# Patient Record
Sex: FEMALE | Race: BLACK OR AFRICAN AMERICAN | URBAN - METROPOLITAN AREA
[De-identification: names, ages, dates, MRNs, and addresses within clinical notes are randomized per-mention and may not be internally consistent; named-entity substitution may affect disease eponyms.]

---

## 2018-01-11 ENCOUNTER — OFFICE VISIT (OUTPATIENT)
Dept: INTERNAL MEDICINE CLINIC | Age: 83
End: 2018-01-11

## 2018-01-11 VITALS
TEMPERATURE: 98.2 F | BODY MASS INDEX: 23.95 KG/M2 | RESPIRATION RATE: 16 BRPM | DIASTOLIC BLOOD PRESSURE: 64 MMHG | WEIGHT: 122 LBS | HEIGHT: 60 IN | HEART RATE: 64 BPM | SYSTOLIC BLOOD PRESSURE: 120 MMHG

## 2018-01-11 DIAGNOSIS — K21.9 GASTROESOPHAGEAL REFLUX DISEASE, ESOPHAGITIS PRESENCE NOT SPECIFIED: ICD-10-CM

## 2018-01-11 DIAGNOSIS — G30.1 LATE ONSET ALZHEIMER'S DISEASE WITH BEHAVIORAL DISTURBANCE (HCC): ICD-10-CM

## 2018-01-11 DIAGNOSIS — Z86.73 H/O: CVA (CEREBROVASCULAR ACCIDENT): ICD-10-CM

## 2018-01-11 DIAGNOSIS — R73.03 PRE-DIABETES: ICD-10-CM

## 2018-01-11 DIAGNOSIS — F02.818 LATE ONSET ALZHEIMER'S DISEASE WITH BEHAVIORAL DISTURBANCE (HCC): ICD-10-CM

## 2018-01-11 DIAGNOSIS — I10 ESSENTIAL HYPERTENSION: Primary | ICD-10-CM

## 2018-01-11 PROCEDURE — 99204 OFFICE O/P NEW MOD 45 MIN: CPT | Performed by: INTERNAL MEDICINE

## 2018-01-11 RX ORDER — ACETAMINOPHEN 160 MG
1 TABLET,DISINTEGRATING ORAL DAILY
Qty: 180 CAPSULE | Refills: 5 | Status: SHIPPED | OUTPATIENT
Start: 2018-01-11 | End: 2019-01-24 | Stop reason: SDUPTHER

## 2018-01-11 RX ORDER — BRIMONIDINE TARTRATE 2 MG/ML
1 SOLUTION/ DROPS OPHTHALMIC 2 TIMES DAILY
COMMUNITY

## 2018-01-11 RX ORDER — ATORVASTATIN CALCIUM 20 MG/1
20 TABLET, FILM COATED ORAL DAILY
COMMUNITY
End: 2018-01-11 | Stop reason: SDUPTHER

## 2018-01-11 RX ORDER — ACETAMINOPHEN 160 MG
1 TABLET,DISINTEGRATING ORAL DAILY
COMMUNITY
End: 2018-01-11 | Stop reason: SDUPTHER

## 2018-01-11 RX ORDER — LOSARTAN POTASSIUM 50 MG/1
50 TABLET ORAL DAILY
Qty: 90 TABLET | Refills: 2 | Status: SHIPPED | OUTPATIENT
Start: 2018-01-11 | End: 2018-10-16 | Stop reason: SDUPTHER

## 2018-01-11 RX ORDER — CALCIUM CARBONATE 500(1250)
500 TABLET ORAL DAILY
Qty: 180 TABLET | Refills: 5 | Status: SHIPPED | OUTPATIENT
Start: 2018-01-11 | End: 2020-03-05 | Stop reason: ALTCHOICE

## 2018-01-11 RX ORDER — CALCIUM CARBONATE 500(1250)
500 TABLET ORAL DAILY
COMMUNITY
End: 2018-01-11 | Stop reason: SDUPTHER

## 2018-01-11 RX ORDER — AMLODIPINE BESYLATE 10 MG/1
10 TABLET ORAL DAILY
COMMUNITY
End: 2018-01-11 | Stop reason: SDUPTHER

## 2018-01-11 RX ORDER — DORZOLAMIDE HCL 20 MG/ML
1 SOLUTION/ DROPS OPHTHALMIC 2 TIMES DAILY
COMMUNITY

## 2018-01-11 RX ORDER — LOSARTAN POTASSIUM 50 MG/1
50 TABLET ORAL DAILY
COMMUNITY
End: 2018-01-11 | Stop reason: SDUPTHER

## 2018-01-11 RX ORDER — POTASSIUM CHLORIDE 750 MG/1
10 TABLET, EXTENDED RELEASE ORAL DAILY
COMMUNITY
End: 2018-01-11 | Stop reason: SDUPTHER

## 2018-01-11 RX ORDER — FLUOXETINE HYDROCHLORIDE 20 MG/1
20 CAPSULE ORAL EVERY MORNING
Qty: 30 CAPSULE | Refills: 3 | Status: SHIPPED | OUTPATIENT
Start: 2018-01-11 | End: 2018-05-07 | Stop reason: SDUPTHER

## 2018-01-11 RX ORDER — POTASSIUM CHLORIDE 20 MEQ/1
20 TABLET, EXTENDED RELEASE ORAL DAILY
Qty: 90 TABLET | Refills: 1 | Status: SHIPPED | OUTPATIENT
Start: 2018-01-11 | End: 2018-07-05 | Stop reason: SDUPTHER

## 2018-01-11 RX ORDER — OMEPRAZOLE 40 MG/1
40 CAPSULE, DELAYED RELEASE ORAL
Qty: 30 CAPSULE | Refills: 3 | Status: SHIPPED | OUTPATIENT
Start: 2018-01-11 | End: 2018-05-07 | Stop reason: SDUPTHER

## 2018-01-11 RX ORDER — AMLODIPINE BESYLATE 10 MG/1
10 TABLET ORAL DAILY
Qty: 90 TABLET | Refills: 2 | Status: SHIPPED | OUTPATIENT
Start: 2018-01-11 | End: 2018-09-06 | Stop reason: ALTCHOICE

## 2018-01-11 RX ORDER — ATORVASTATIN CALCIUM 20 MG/1
20 TABLET, FILM COATED ORAL DAILY
Qty: 90 TABLET | Refills: 2 | Status: SHIPPED | OUTPATIENT
Start: 2018-01-11 | End: 2018-09-06 | Stop reason: SDUPTHER

## 2018-01-11 RX ORDER — QUETIAPINE FUMARATE 25 MG/1
25 TABLET, FILM COATED ORAL DAILY
Qty: 90 TABLET | Refills: 2 | Status: SHIPPED | OUTPATIENT
Start: 2018-01-11 | End: 2018-09-06 | Stop reason: SDUPTHER

## 2018-01-11 RX ORDER — QUETIAPINE FUMARATE 25 MG/1
25 TABLET, FILM COATED ORAL DAILY
COMMUNITY
End: 2018-01-11 | Stop reason: SDUPTHER

## 2018-01-11 RX ORDER — DONEPEZIL HYDROCHLORIDE 5 MG/1
5 TABLET, FILM COATED ORAL NIGHTLY
Qty: 30 TABLET | Refills: 1 | Status: SHIPPED | OUTPATIENT
Start: 2018-01-11 | End: 2018-03-07 | Stop reason: SDUPTHER

## 2018-01-14 PROBLEM — G30.1 LATE ONSET ALZHEIMER'S DISEASE WITH BEHAVIORAL DISTURBANCE (HCC): Status: ACTIVE | Noted: 2018-01-14

## 2018-01-14 PROBLEM — F02.818 LATE ONSET ALZHEIMER'S DISEASE WITH BEHAVIORAL DISTURBANCE (HCC): Status: ACTIVE | Noted: 2018-01-14

## 2018-01-14 ASSESSMENT — ENCOUNTER SYMPTOMS
VOMITING: 0
COUGH: 0
CONSTIPATION: 0
SHORTNESS OF BREATH: 0
DIARRHEA: 0

## 2018-01-15 NOTE — PROGRESS NOTES
results found for any previous visit. No results found. I have reviewed all the available results (including but not limited to labs, Xrays, CT scans, ultrasound, Echo, and angiogram report) and / or personally reviewed the images where available, including results available from outside the [facility]    I have ordered outside records where not available through EMR    Assessment:      ICD-10-CM ICD-9-CM    1. Essential hypertension I10 401.9 RENAL FUNCTION PANEL      CBC WITH AUTO DIFFERENTIAL      TSH without Reflex      T4, Free      LIPID PANEL   2. Late onset Alzheimer's disease with behavioral disturbance G30.1 331.0 VITAMIN B12    F02.81 294.11 MRI Brain W WO Contrast   3. Pre-diabetes R73.03 790.29 LIPID PANEL      HEMOGLOBIN A1C   4. H/O: CVA (cerebrovascular accident) Z86.73 V12.54 LIPID PANEL   5. Gastroesophageal reflux disease, esophagitis presence not specified K21.9 530.81        HTN well controlled    Plan:    Start Aricept 5 mg and reduce the Seroquel to prn  Check for organic causes of memory loss  Start Prozac 20 mg for anxiety and panic   Continue BP meds, statin, and ASA      Orders Placed This Encounter   Procedures    MRI Brain W WO Contrast    VITAMIN B12    RENAL FUNCTION PANEL    CBC WITH AUTO DIFFERENTIAL    TSH without Reflex    T4, Free    LIPID PANEL    HEMOGLOBIN A1C          Medication List          Accurate as of 1/11/18 11:59 PM. If you have any questions, ask your nurse or doctor.                START taking these medications    donepezil 5 MG tablet  Commonly known as:  ARICEPT  Take 1 tablet by mouth nightly  Started by:  Maria Luisa Schaffer MD     FLUoxetine 20 MG capsule  Commonly known as:  PROZAC  Take 1 capsule by mouth every morning  Started by:  Maria Luisa Schaffer MD     omeprazole 40 MG delayed release capsule  Commonly known as:  PRILOSEC  Take 1 capsule by mouth every morning (before breakfast)  Started by:  Maria Luisa Schaffer MD        CHANGE

## 2018-02-15 ENCOUNTER — HOSPITAL ENCOUNTER (OUTPATIENT)
Dept: MRI IMAGING | Age: 83
Discharge: HOME | End: 2018-02-15
Attending: INTERNAL MEDICINE | Admitting: INTERNAL MEDICINE

## 2018-02-15 DIAGNOSIS — G30.1 LATE ONSET ALZHEIMER'S DISEASE WITH BEHAVIORAL DISTURBANCE (HCC): ICD-10-CM

## 2018-02-15 DIAGNOSIS — G30.1 ALZHEIMER'S DISEASE WITH LATE ONSET (CODE) (HCC): ICD-10-CM

## 2018-02-15 DIAGNOSIS — F02.818 LATE ONSET ALZHEIMER'S DISEASE WITH BEHAVIORAL DISTURBANCE (HCC): ICD-10-CM

## 2018-03-07 RX ORDER — DONEPEZIL HYDROCHLORIDE 5 MG/1
TABLET, FILM COATED ORAL
Qty: 30 TABLET | Refills: 1 | Status: SHIPPED | OUTPATIENT
Start: 2018-03-07 | End: 2018-09-04 | Stop reason: SDUPTHER

## 2018-04-20 ENCOUNTER — TELEPHONE (OUTPATIENT)
Dept: INTERNAL MEDICINE CLINIC | Age: 83
End: 2018-04-20

## 2018-05-03 ENCOUNTER — OFFICE VISIT (OUTPATIENT)
Dept: INTERNAL MEDICINE CLINIC | Age: 83
End: 2018-05-03

## 2018-05-03 VITALS
HEART RATE: 64 BPM | SYSTOLIC BLOOD PRESSURE: 136 MMHG | BODY MASS INDEX: 21.19 KG/M2 | WEIGHT: 114 LBS | DIASTOLIC BLOOD PRESSURE: 68 MMHG | RESPIRATION RATE: 16 BRPM

## 2018-05-03 DIAGNOSIS — F02.818 LATE ONSET ALZHEIMER'S DISEASE WITH BEHAVIORAL DISTURBANCE (HCC): ICD-10-CM

## 2018-05-03 DIAGNOSIS — I10 ESSENTIAL HYPERTENSION: Primary | ICD-10-CM

## 2018-05-03 DIAGNOSIS — G30.1 LATE ONSET ALZHEIMER'S DISEASE WITH BEHAVIORAL DISTURBANCE (HCC): ICD-10-CM

## 2018-05-03 DIAGNOSIS — Z78.0 ASYMPTOMATIC MENOPAUSAL STATE: ICD-10-CM

## 2018-05-03 PROCEDURE — 99213 OFFICE O/P EST LOW 20 MIN: CPT | Performed by: INTERNAL MEDICINE

## 2018-05-06 ASSESSMENT — ENCOUNTER SYMPTOMS
SHORTNESS OF BREATH: 0
RHINORRHEA: 0
BACK PAIN: 0
COUGH: 0
WHEEZING: 0
APNEA: 0
CONSTIPATION: 0
TROUBLE SWALLOWING: 0
EYE PAIN: 0
SORE THROAT: 0
NAUSEA: 0
EYE DISCHARGE: 0
ABDOMINAL DISTENTION: 0
DIARRHEA: 0
BLOOD IN STOOL: 0
VOMITING: 0
ABDOMINAL PAIN: 0

## 2018-05-07 RX ORDER — OMEPRAZOLE 40 MG/1
CAPSULE, DELAYED RELEASE ORAL
Qty: 30 CAPSULE | Refills: 3 | Status: SHIPPED | OUTPATIENT
Start: 2018-05-07 | End: 2018-09-04 | Stop reason: SDUPTHER

## 2018-05-07 RX ORDER — FLUOXETINE HYDROCHLORIDE 20 MG/1
CAPSULE ORAL
Qty: 30 CAPSULE | Refills: 3 | Status: SHIPPED | OUTPATIENT
Start: 2018-05-07 | End: 2018-09-04 | Stop reason: SDUPTHER

## 2018-05-07 RX ORDER — DONEPEZIL HYDROCHLORIDE 5 MG/1
TABLET, FILM COATED ORAL
Qty: 30 TABLET | Refills: 1 | Status: SHIPPED | OUTPATIENT
Start: 2018-05-07 | End: 2018-07-05 | Stop reason: SDUPTHER

## 2018-07-05 RX ORDER — DONEPEZIL HYDROCHLORIDE 5 MG/1
TABLET, FILM COATED ORAL
Qty: 30 TABLET | Refills: 1 | Status: SHIPPED | OUTPATIENT
Start: 2018-07-05 | End: 2018-09-04 | Stop reason: ALTCHOICE

## 2018-07-05 RX ORDER — POTASSIUM CHLORIDE 1500 MG/1
TABLET, EXTENDED RELEASE ORAL
Qty: 90 TABLET | Refills: 1 | Status: SHIPPED | OUTPATIENT
Start: 2018-07-05 | End: 2019-03-07

## 2018-07-18 ENCOUNTER — TELEPHONE (OUTPATIENT)
Dept: INTERNAL MEDICINE CLINIC | Age: 83
End: 2018-07-18

## 2018-09-04 RX ORDER — DONEPEZIL HYDROCHLORIDE 5 MG/1
TABLET, FILM COATED ORAL
Qty: 30 TABLET | Refills: 0 | Status: SHIPPED | OUTPATIENT
Start: 2018-09-04 | End: 2018-09-06 | Stop reason: SDUPTHER

## 2018-09-04 RX ORDER — FLUOXETINE HYDROCHLORIDE 20 MG/1
CAPSULE ORAL
Qty: 30 CAPSULE | Refills: 0 | Status: SHIPPED | OUTPATIENT
Start: 2018-09-04 | End: 2018-09-06 | Stop reason: SDUPTHER

## 2018-09-04 RX ORDER — OMEPRAZOLE 40 MG/1
CAPSULE, DELAYED RELEASE ORAL
Qty: 30 CAPSULE | Refills: 0 | Status: SHIPPED | OUTPATIENT
Start: 2018-09-04 | End: 2019-03-07

## 2018-09-06 ENCOUNTER — OFFICE VISIT (OUTPATIENT)
Dept: INTERNAL MEDICINE CLINIC | Age: 83
End: 2018-09-06

## 2018-09-06 VITALS
RESPIRATION RATE: 16 BRPM | DIASTOLIC BLOOD PRESSURE: 66 MMHG | TEMPERATURE: 98.3 F | HEART RATE: 96 BPM | BODY MASS INDEX: 21.99 KG/M2 | WEIGHT: 112 LBS | SYSTOLIC BLOOD PRESSURE: 96 MMHG | HEIGHT: 60 IN

## 2018-09-06 DIAGNOSIS — I10 ESSENTIAL HYPERTENSION: ICD-10-CM

## 2018-09-06 DIAGNOSIS — E78.00 HYPERCHOLESTEROLEMIA: ICD-10-CM

## 2018-09-06 DIAGNOSIS — F41.8 DEPRESSION WITH ANXIETY: Primary | ICD-10-CM

## 2018-09-06 PROCEDURE — 90662 IIV NO PRSV INCREASED AG IM: CPT | Performed by: INTERNAL MEDICINE

## 2018-09-06 PROCEDURE — 99214 OFFICE O/P EST MOD 30 MIN: CPT | Performed by: INTERNAL MEDICINE

## 2018-09-06 PROCEDURE — 90471 IMMUNIZATION ADMIN: CPT | Performed by: INTERNAL MEDICINE

## 2018-09-06 RX ORDER — DONEPEZIL HYDROCHLORIDE 5 MG/1
TABLET, FILM COATED ORAL
Qty: 90 TABLET | Refills: 1 | Status: SHIPPED | OUTPATIENT
Start: 2018-09-06 | End: 2019-03-07 | Stop reason: DRUGHIGH

## 2018-09-06 RX ORDER — ATORVASTATIN CALCIUM 20 MG/1
20 TABLET, FILM COATED ORAL DAILY
Qty: 90 TABLET | Refills: 1 | Status: SHIPPED | OUTPATIENT
Start: 2018-09-06 | End: 2019-03-07 | Stop reason: DRUGHIGH

## 2018-09-06 RX ORDER — FLUOXETINE HYDROCHLORIDE 20 MG/1
CAPSULE ORAL
Qty: 90 CAPSULE | Refills: 1 | Status: SHIPPED | OUTPATIENT
Start: 2018-09-06 | End: 2019-03-07

## 2018-09-06 RX ORDER — QUETIAPINE FUMARATE 25 MG/1
25 TABLET, FILM COATED ORAL DAILY
Qty: 90 TABLET | Refills: 1 | Status: SHIPPED | OUTPATIENT
Start: 2018-09-06 | End: 2019-03-07 | Stop reason: ALTCHOICE

## 2018-09-06 NOTE — PROGRESS NOTES
PROGRESS NOTE:    Demian Ibrahim    9/6/2018    Chief Complaint   Patient presents with    New Patient     Former patient Dr. Singh Dove     HPI:    Mr(s)Melva Ibrahim presents to clinic today with issues noted above. Patient is taking BP medications at home without side effects, BP is not being checked at home. Patient is tolerating anti-lipid meds such as statin without complications, no myalgia. Patient is taking antidepressant/anxiolytics as prescribed, denies suicidal idealolgy/plan, crying out episodes, feels anxiety and depression is overall controlled. Patient denies chest pain, SOB, NVD, FC, rash, malaise, rigor, dizziness/lightheadness, other pertinent ROS was also reviewed. BP 96/66 (Site: Right Arm, Position: Sitting, Cuff Size: Medium Adult)   Pulse 96 Comment: Regular  Temp 98.3 °F (36.8 °C) (Oral)   Resp 16   Ht 5' (1.524 m)   Wt 112 lb (50.8 kg)   BMI 21.87 kg/m²   Body mass index is 21.87 kg/m². No Known Allergies    Physical Exam:    Gen: Patient appears well groomed, well appearing  HEAD: Atraumatic, normocephalic,   Eyes: PERRLA, EOMI   Neck: supple, no thyroid nodule appreciated, no JVD  Chest: Clear to auscultation REBECCA, unlabored breathing, normal expansion  Heart: Regular rate, regular rhythm, no murmur, no rub  Abdomen: Non-tender, non-distended, bowel sounds present x3  Extremities: no edema, distal pulses intact  Patient was alert and oriented to person, place and time    Ada was seen today for new patient. Diagnoses and all orders for this visit:    Depression with anxiety  Patient is taking antidepressant/anxiolytics as prescribed, denies suicidal idealolgy/plan, crying out episodes, feels anxiety and depression is overall controlled. Essential hypertension  Controlled overall, continue meds, follow renal function     Hypercholesterolemia  Patient is tolerating anti-lipid meds such as statin without complications, no myalgia.        Preventive medicine: patient has agreed to indicated immunizations. Needs fluzone today. No orders of the defined types were placed in this encounter. Prior to Admission medications    Medication Sig Start Date End Date Taking?  Authorizing Provider   donepezil (ARICEPT) 5 MG tablet TAKE ONE TABLET BY MOUTH ONCE DAILY AT  NIGHT 9/4/18  Yes Emir Chua, DO   FLUoxetine (PROZAC) 20 MG capsule TAKE ONE CAPSULE BY MOUTH ONCE DAILY IN THE MORNING 9/4/18  Yes Ej Macdonald, DO   KLOR-CON M20 20 MEQ extended release tablet TAKE ONE TABLET BY MOUTH ONCE DAILY 7/5/18  Yes Abrahan Tomas MD   bimatoprost (LUMIGAN) 0.01 % SOLN ophthalmic drops Place 1 drop into both eyes nightly   Yes Historical Provider, MD   brimonidine (ALPHAGAN) 0.2 % ophthalmic solution Place 1 drop into both eyes 2 times daily   Yes Historical Provider, MD   dorzolamide (TRUSOPT) 2 % ophthalmic solution Place 1 drop into both eyes 2 times daily   Yes Historical Provider, MD   atorvastatin (LIPITOR) 20 MG tablet Take 1 tablet by mouth daily 1/11/18  Yes Abrahan Tomas MD   losartan (COZAAR) 50 MG tablet Take 1 tablet by mouth daily 1/11/18  Yes Abrahan Tomas MD   Cholecalciferol (VITAMIN D3) 2000 units CAPS Take 1 capsule by mouth daily 1/11/18  Yes Abrahan Tomas MD   calcium carbonate (OSCAL) 500 MG TABS tablet Take 1 tablet by mouth daily 1/11/18  Yes Abrahan Tomas MD   omeprazole (PRILOSEC) 40 MG delayed release capsule TAKE ONE CAPSULE BY MOUTH ONCE DAILY AS NEEDED ONLY 9/4/18   Emir Chua, DO   zoster recombinant adjuvanted vaccine [facility]) 50 MCG SUSR injection Inject 0.5 ml intramuscularly now and repeat dose in 2 months 5/3/18   Abrahan Tomas MD   QUEtiapine (SEROQUEL) 25 MG tablet Take 1 tablet by mouth daily 1/11/18   Abrahan Tomas MD     Past Medical History:   Diagnosis Date    Glaucoma     HTN (hypertension)     Hyperlipidemia      Past Surgical History:   Procedure Laterality Date    CATARACT REMOVAL      COLONOSCOPY  2001

## 2018-09-06 NOTE — PATIENT INSTRUCTIONS
Patient Education        DASH Diet: Care Instructions  Your Care Instructions    The DASH diet is an eating plan that can help lower your blood pressure. DASH stands for Dietary Approaches to Stop Hypertension. Hypertension is high blood pressure. The DASH diet focuses on eating foods that are high in calcium, potassium, and magnesium. These nutrients can lower blood pressure. The foods that are highest in these nutrients are fruits, vegetables, low-fat dairy products, nuts, seeds, and legumes. But taking calcium, potassium, and magnesium supplements instead of eating foods that are high in those nutrients does not have the same effect. The DASH diet also includes whole grains, fish, and poultry. The DASH diet is one of several lifestyle changes your doctor may recommend to lower your high blood pressure. Your doctor may also want you to decrease the amount of sodium in your diet. Lowering sodium while following the DASH diet can lower blood pressure even further than just the DASH diet alone. Follow-up care is a key part of your treatment and safety. Be sure to make and go to all appointments, and call your doctor if you are having problems. It's also a good idea to know your test results and keep a list of the medicines you take. How can you care for yourself at home? Following the DASH diet  · Eat 4 to 5 servings of fruit each day. A serving is 1 medium-sized piece of fruit, ½ cup chopped or canned fruit, 1/4 cup dried fruit, or 4 ounces (½ cup) of fruit juice. Choose fruit more often than fruit juice. · Eat 4 to 5 servings of vegetables each day. A serving is 1 cup of lettuce or raw leafy vegetables, ½ cup of chopped or cooked vegetables, or 4 ounces (½ cup) of vegetable juice. Choose vegetables more often than vegetable juice. · Get 2 to 3 servings of low-fat and fat-free dairy each day. A serving is 8 ounces of milk, 1 cup of yogurt, or 1 ½ ounces of cheese. · Eat 6 to 8 servings of grains each day. meals using beans and peas. Add garbanzo or kidney beans to salads. Make burritos and tacos with mashed malcolm beans or black beans. Where can you learn more? Go to https://[email or web address].Movli. org and sign in to your [employer] account. Enter W189 in the [employer] box to learn more about \"DASH Diet: Care Instructions. \"     If you do not have an account, please click on the \"Sign Up Now\" link. Current as of: December 6, 2017  Content Version: 11.7  © 6760-0050 [employer]. Care instructions adapted under license by [facility] ([facility]). If you have questions about a medical condition or this instruction, always ask your healthcare professional. [address] any warranty or liability for your use of this information.

## 2018-09-06 NOTE — PROGRESS NOTES
Vaccine Information Sheet, \"Influenza - Inactivated\"  given to Pia Cunningham, or parent/legal guardian of  Pia Cunningham and verbalized understanding. Patient responses:    Have you ever had a reaction to a flu vaccine? No  Are you able to eat eggs without adverse effects? Yes  Do you have any current illness? No  Have you ever had Guillian [place] Syndrome? No    Flu vaccine given per order. Please see immunization tab.

## 2018-09-20 ENCOUNTER — TELEPHONE (OUTPATIENT)
Dept: INTERNAL MEDICINE CLINIC | Age: 83
End: 2018-09-20

## 2018-09-21 NOTE — TELEPHONE ENCOUNTER
Average ~081-559 systolic, also diastolic is fluctuating quite a bit, ensure BP is checked same time daily, such as getting up in the morning and check BP first, back is supported, arm @ heart level, same arm (right preferred), keep log and also keep HR values also.

## 2018-10-16 DIAGNOSIS — I10 ESSENTIAL HYPERTENSION: Primary | ICD-10-CM

## 2018-10-16 DIAGNOSIS — E78.00 HYPERCHOLESTEROLEMIA: ICD-10-CM

## 2018-10-16 RX ORDER — LOSARTAN POTASSIUM 50 MG/1
50 TABLET ORAL DAILY
Qty: 90 TABLET | Refills: 0 | Status: SHIPPED | OUTPATIENT
Start: 2018-10-16 | End: 2019-03-07

## 2018-11-02 ENCOUNTER — TELEPHONE (OUTPATIENT)
Dept: INTERNAL MEDICINE CLINIC | Age: 83
End: 2018-11-02

## 2019-01-29 RX ORDER — ACETAMINOPHEN 160 MG
1 TABLET,DISINTEGRATING ORAL DAILY
Qty: 90 CAPSULE | Refills: 1 | Status: SHIPPED | OUTPATIENT
Start: 2019-01-29 | End: 2020-03-05 | Stop reason: ALTCHOICE

## 2019-03-07 ENCOUNTER — OFFICE VISIT (OUTPATIENT)
Dept: INTERNAL MEDICINE CLINIC | Age: 84
End: 2019-03-07
Payer: COMMERCIAL

## 2019-03-07 ENCOUNTER — TELEPHONE (OUTPATIENT)
Dept: INTERNAL MEDICINE CLINIC | Age: 84
End: 2019-03-07

## 2019-03-07 VITALS
TEMPERATURE: 97.5 F | SYSTOLIC BLOOD PRESSURE: 110 MMHG | RESPIRATION RATE: 16 BRPM | WEIGHT: 109 LBS | BODY MASS INDEX: 21.29 KG/M2 | HEART RATE: 60 BPM | DIASTOLIC BLOOD PRESSURE: 60 MMHG | OXYGEN SATURATION: 98 %

## 2019-03-07 DIAGNOSIS — F02.818 LATE ONSET ALZHEIMER'S DISEASE WITH BEHAVIORAL DISTURBANCE (HCC): ICD-10-CM

## 2019-03-07 DIAGNOSIS — I10 ESSENTIAL HYPERTENSION: Primary | ICD-10-CM

## 2019-03-07 DIAGNOSIS — G30.1 LATE ONSET ALZHEIMER'S DISEASE WITH BEHAVIORAL DISTURBANCE (HCC): ICD-10-CM

## 2019-03-07 DIAGNOSIS — E78.00 HYPERCHOLESTEROLEMIA: ICD-10-CM

## 2019-03-07 DIAGNOSIS — F41.8 DEPRESSION WITH ANXIETY: ICD-10-CM

## 2019-03-07 DIAGNOSIS — K59.09 OTHER CONSTIPATION: ICD-10-CM

## 2019-03-07 DIAGNOSIS — F51.02 ADJUSTMENT INSOMNIA: ICD-10-CM

## 2019-03-07 PROCEDURE — 99214 OFFICE O/P EST MOD 30 MIN: CPT | Performed by: INTERNAL MEDICINE

## 2019-03-07 RX ORDER — AMITRIPTYLINE HYDROCHLORIDE 25 MG/1
25 TABLET, FILM COATED ORAL NIGHTLY
Qty: 90 TABLET | Refills: 0 | Status: SHIPPED | OUTPATIENT
Start: 2019-03-07 | End: 2019-06-10 | Stop reason: SDUPTHER

## 2019-03-07 RX ORDER — DONEPEZIL HYDROCHLORIDE 10 MG/1
TABLET, FILM COATED ORAL
Qty: 90 TABLET | Refills: 1 | Status: SHIPPED | OUTPATIENT
Start: 2019-03-07 | End: 2019-04-05 | Stop reason: SDUPTHER

## 2019-03-07 RX ORDER — ATORVASTATIN CALCIUM 20 MG/1
10 TABLET, FILM COATED ORAL DAILY
Qty: 90 TABLET | Refills: 1 | Status: SHIPPED
Start: 2019-03-07 | End: 2019-09-05 | Stop reason: ALTCHOICE

## 2019-03-07 RX ORDER — POLYETHYLENE GLYCOL 3350 17 G/17G
17 POWDER, FOR SOLUTION ORAL DAILY PRN
Qty: 510 G | Refills: 0 | Status: SHIPPED | OUTPATIENT
Start: 2019-03-07 | End: 2020-03-05

## 2019-03-07 ASSESSMENT — PATIENT HEALTH QUESTIONNAIRE - PHQ9
SUM OF ALL RESPONSES TO PHQ QUESTIONS 1-9: 1
SUM OF ALL RESPONSES TO PHQ9 QUESTIONS 1 & 2: 1
1. LITTLE INTEREST OR PLEASURE IN DOING THINGS: 1
SUM OF ALL RESPONSES TO PHQ QUESTIONS 1-9: 1
2. FEELING DOWN, DEPRESSED OR HOPELESS: 0

## 2019-03-26 ENCOUNTER — TELEPHONE (OUTPATIENT)
Dept: INTERNAL MEDICINE CLINIC | Age: 84
End: 2019-03-26

## 2019-03-26 NOTE — TELEPHONE ENCOUNTER
Sylwia Dietrich states patient's amitriptyline (ELAVIL) 25 MG tablet and previous medication QUEtiapine (SEROQUEL) 25 MG tablet tablet were interchanged. Sylwia Dietrich also states on the nights of 3/14, 3/15 and 3/16, patient was given Seroquel because patient was out of amitriptyline. Sylwia Dietrich states patient has hallucinations and they have increased since the medication was interchanged. Please advise. Sylwia Dietrich can be reached at [phone].

## 2019-04-02 RX ORDER — DONEPEZIL HYDROCHLORIDE 5 MG/1
TABLET, FILM COATED ORAL
Qty: 90 TABLET | Refills: 1 | OUTPATIENT
Start: 2019-04-02

## 2019-04-02 NOTE — TELEPHONE ENCOUNTER
Patient's daughter requesting the status of refill. Bernardino Maddox never received the refill on 3/7/2019. Please send refill for  donepezil (ARICEPT) 10 MG tablet #90 to pharmacy.

## 2019-04-02 NOTE — TELEPHONE ENCOUNTER
[address] requesting a refill for donepezil (ARICEPT) 10 MG tablet. On 3/7/19, there was no confirmation that the refill was received. Please send refill to pharmacy.

## 2019-04-03 ENCOUNTER — TELEPHONE (OUTPATIENT)
Dept: INTERNAL MEDICINE CLINIC | Age: 84
End: 2019-04-03

## 2019-04-03 NOTE — TELEPHONE ENCOUNTER
Patient's daughter is requesting a new prescription for donepezil (ARICEPT) 5 MG tablet #90. The pharmacy did not confirm receipt on 3/7 and states the prescription was never received. Patient is out of the medication. Please advise or send new prescription to pharmacy.       [address] [place] [address]

## 2019-04-03 NOTE — TELEPHONE ENCOUNTER
Ibis Kamara is pt's daughter and she is calling back to see if message has been addressed. She stated that pt has been out of meds for a week. She was informed that Dr Wendi Cortes and Julio Shore are out of the office. Please call her when meds have been called in.

## 2019-04-05 ENCOUNTER — TELEPHONE (OUTPATIENT)
Dept: INTERNAL MEDICINE CLINIC | Age: 84
End: 2019-04-05

## 2019-04-05 RX ORDER — DONEPEZIL HYDROCHLORIDE 5 MG/1
TABLET, FILM COATED ORAL
Qty: 90 TABLET | Refills: 1 | Status: SHIPPED | OUTPATIENT
Start: 2019-04-05 | End: 2019-09-05 | Stop reason: SDUPTHER

## 2019-04-05 NOTE — TELEPHONE ENCOUNTER
Per Dr. Carlton Hooker, verified patient has been taking the Aricept 5 mg daily per pharmacist.  Aricept refilled at 5 mg . If family feels need 10 mg they will need to contact office for dose aadjustment.

## 2019-04-05 NOTE — TELEPHONE ENCOUNTER
Batool Saldaña stated that she received rx for Aricept 5 mg . And she has one for 10 mg . Please call to verify the doseage.

## 2019-04-09 ENCOUNTER — TELEPHONE (OUTPATIENT)
Dept: INTERNAL MEDICINE CLINIC | Age: 84
End: 2019-04-09

## 2019-04-18 ENCOUNTER — TELEPHONE (OUTPATIENT)
Dept: INTERNAL MEDICINE CLINIC | Age: 84
End: 2019-04-18

## 2019-04-18 NOTE — LETTER
Aamir Warren DO  [address] [facility], [address]  Ph: [phone]    Re:     Kassie Serra     To whom this may concern, please note that Kassie Serra was evaluated in my office 03/07/2019. Please be informed that the patient has the formal diagnosis of Alzheimer's dementia. Any questions please feel free to call my office. Thank you,       Jamal Hill.  Mai Rowe DO

## 2019-04-18 NOTE — TELEPHONE ENCOUNTER
Garrett English, pt POA, requesting documentation of patient's dementia diagnosis to support her [employer] request for her employer. Garrett English can be reached at phone number provided.

## 2019-04-22 NOTE — TELEPHONE ENCOUNTER
Spoke with Micki Escalante, patient's POA, who advises letter would be perfect.   Please call her when letter is ready for  @ [phone]

## 2019-04-29 NOTE — TELEPHONE ENCOUNTER
Pedro Mensah, patient POA, requesting status of letter of documentation. Please call Pedro Mensah at phone number provided.

## 2019-05-21 ENCOUNTER — TELEPHONE (OUTPATIENT)
Dept: INTERNAL MEDICINE CLINIC | Age: 84
End: 2019-05-21

## 2019-05-21 DIAGNOSIS — L60.0 INGROWN NAIL: Primary | ICD-10-CM

## 2019-05-21 NOTE — TELEPHONE ENCOUNTER
Patient has an Ingrown toenail and wants to know if she should see Dr. Arlette Holman or go to a Podiatrist  Please contact patient's daughter Heather Riley at phone # provided.

## 2019-06-06 ENCOUNTER — OFFICE VISIT (OUTPATIENT)
Dept: ORTHOPEDIC SURGERY | Age: 84
End: 2019-06-06
Payer: COMMERCIAL

## 2019-06-06 VITALS
BODY MASS INDEX: 21.38 KG/M2 | HEART RATE: 87 BPM | DIASTOLIC BLOOD PRESSURE: 77 MMHG | SYSTOLIC BLOOD PRESSURE: 132 MMHG | WEIGHT: 108.91 LBS | HEIGHT: 60 IN

## 2019-06-06 DIAGNOSIS — L60.0 ONYCHOCRYPTOSIS: Primary | ICD-10-CM

## 2019-06-06 PROCEDURE — 99202 OFFICE O/P NEW SF 15 MIN: CPT | Performed by: PODIATRIST

## 2019-06-06 NOTE — PROGRESS NOTES
HISTORY OF PRESENT ILLNESS:  This is an initial visit for a 80-year-old female with a chief complaint of thickened and discolored toenails are ingrown. This has been present for at least several years. Her daughter is the primary communicator. Apparently, there are problems with dementia with the patient. PHYSICAL EXAM:  The nails are thickened, discolored, and dystrophic, with subungual debris and distal onycholysis 1-5 bilateral.  She has severe incurvation of the medial border of the left hallux toenail. No signs of paronychia are noted. There is some mild pain on palpation directly over    each nail plate. The pedal pulses are palbable bilateral and the sensationis grossly intact bilateral.  No signs of active tinea pedis infection is noted. ASSESSMENT:  Onychocryptosis      PLAN:  I educated the patient on the pathology and its various treatment options. We discussed performing a partial matrixectomy procedure. For now she will try to debride the nail in a different way to create comfort. She will let me know if she would like to proceed with the procedure.

## 2019-06-12 ENCOUNTER — TELEPHONE (OUTPATIENT)
Dept: INTERNAL MEDICINE CLINIC | Age: 84
End: 2019-06-12

## 2019-06-12 RX ORDER — AMITRIPTYLINE HYDROCHLORIDE 25 MG/1
TABLET, FILM COATED ORAL
Qty: 90 TABLET | Refills: 0 | Status: SHIPPED | OUTPATIENT
Start: 2019-06-12 | End: 2019-06-13 | Stop reason: SDUPTHER

## 2019-06-12 NOTE — TELEPHONE ENCOUNTER
Patient called regarding her prescription for amitriptyline (ELAVIL) 25 MG tablet. Can this be resent to the Pharmacy.      E-Prescribing Status: Transmission to pharmacy failed (6/12/2019  9:12 AM EDT)

## 2019-06-13 ENCOUNTER — TELEPHONE (OUTPATIENT)
Dept: INTERNAL MEDICINE CLINIC | Age: 84
End: 2019-06-13

## 2019-06-13 RX ORDER — AMITRIPTYLINE HYDROCHLORIDE 25 MG/1
TABLET, FILM COATED ORAL
Qty: 90 TABLET | Refills: 0 | Status: SHIPPED | OUTPATIENT
Start: 2019-06-13 | End: 2019-09-05 | Stop reason: ALTCHOICE

## 2019-07-25 ENCOUNTER — TELEPHONE (OUTPATIENT)
Dept: INTERNAL MEDICINE CLINIC | Age: 84
End: 2019-07-25

## 2019-07-30 RX ORDER — DONEPEZIL HYDROCHLORIDE 5 MG/1
TABLET, FILM COATED ORAL
Qty: 90 TABLET | Refills: 1 | OUTPATIENT
Start: 2019-07-30

## 2019-09-05 ENCOUNTER — OFFICE VISIT (OUTPATIENT)
Dept: INTERNAL MEDICINE CLINIC | Age: 84
End: 2019-09-05
Payer: COMMERCIAL

## 2019-09-05 VITALS
SYSTOLIC BLOOD PRESSURE: 138 MMHG | WEIGHT: 108.6 LBS | DIASTOLIC BLOOD PRESSURE: 88 MMHG | OXYGEN SATURATION: 98 % | BODY MASS INDEX: 21.21 KG/M2 | RESPIRATION RATE: 14 BRPM | HEART RATE: 72 BPM

## 2019-09-05 DIAGNOSIS — F02.818 LATE ONSET ALZHEIMER'S DISEASE WITH BEHAVIORAL DISTURBANCE (HCC): Primary | ICD-10-CM

## 2019-09-05 DIAGNOSIS — Z91.81 AT HIGH RISK FOR FALLS: ICD-10-CM

## 2019-09-05 DIAGNOSIS — G30.1 LATE ONSET ALZHEIMER'S DISEASE WITH BEHAVIORAL DISTURBANCE (HCC): Primary | ICD-10-CM

## 2019-09-05 PROCEDURE — 99214 OFFICE O/P EST MOD 30 MIN: CPT | Performed by: INTERNAL MEDICINE

## 2019-09-05 RX ORDER — DONEPEZIL HYDROCHLORIDE 10 MG/1
TABLET, FILM COATED ORAL
Qty: 30 TABLET | Refills: 5 | Status: SHIPPED | OUTPATIENT
Start: 2019-09-05 | End: 2020-03-05

## 2019-09-05 RX ORDER — QUETIAPINE FUMARATE 25 MG/1
25 TABLET, FILM COATED ORAL EVERY EVENING
Qty: 30 TABLET | Refills: 5 | Status: SHIPPED | OUTPATIENT
Start: 2019-09-05 | End: 2019-09-09 | Stop reason: SINTOL

## 2019-09-05 NOTE — PROGRESS NOTES
2019    Simon Us (:  10/21/1932) is a 80 y.o. female, here for evaluation of the following medical concerns:    HPI  New patient here with her daughter Lily Houston) for evaluation. She is on aricept 5mg daily for dementia. She is pleasant with her family. She is however having more issues with wandering around the house at night. She lives with her son and niece. No dangerous behaviors - she does not touch the stove or oven, she is not doing laundry, she does not go outside (she has to be encouraged by her family on a regular basis to even leave the house. She stays on the threshold usually and states \"I'm scared. \")  This is however distressing for her son who is not getting much sleep because he is waking up to check on her. When her daughter stays the night over there she sleeps in bed with her which stems the wandering behavior. Her family does not think the   She was on donepezil 10mg at one point but got taken back to 5mg for unknown reasons. Was on seroquel in the past at bedtime. but got switched to amitriptyline which has not been as effective. Has stopped statin at this point per family    Review of Systems   Unable to perform ROS: Dementia       Prior to Visit Medications    Medication Sig Taking?  Authorizing Provider   amitriptyline (ELAVIL) 25 MG tablet TAKE 1 TABLET BY MOUTH ONCE DAILY AT NIGHT Yes Bhavna Patel, DO   donepezil (ARICEPT) 5 MG tablet TAKE 1 TABLET BY MOUTH ONCE DAILY AT NIGHT Yes Bhavna Patel, DO   psyllium (METAMUCIL SMOOTH TEXTURE) 28 % packet Take 1 packet by mouth 2 times daily Take with full glass of h20 or juice Yes Bhavna Patel, DO   polyethylene glycol (GLYCOLAX) powder Take 17 g by mouth daily as needed (constipation if no BM after 3 days) Yes Bhavna Patel, DO   Cholecalciferol (VITAMIN D3) 2000 units CAPS Take 1 capsule by mouth daily Yes Bhavna Patel, DO   bimatoprost (LUMIGAN) 0.01 % SOLN ophthalmic drops Place 1 drop into both eyes nightly Yes Historical Provider, MD   brimonidine (ALPHAGAN) 0.2 % ophthalmic solution Place 1 drop into both eyes 2 times daily Yes Historical Provider, MD   dorzolamide (TRUSOPT) 2 % ophthalmic solution Place 1 drop into both eyes 2 times daily Yes Historical Provider, MD   calcium carbonate (OSCAL) 500 MG TABS tablet Take 1 tablet by mouth daily Yes Igor Victor MD   atorvastatin (LIPITOR) 20 MG tablet Take 0.5 tablets by mouth daily  Patient not taking: Reported on 9/5/2019  Pratik Huston DO        No Known Allergies    Past Medical History:   Diagnosis Date    Glaucoma     HTN (hypertension)     Hyperlipidemia        Past Surgical History:   Procedure Laterality Date    CATARACT REMOVAL      COLONOSCOPY  2001    COLONOSCOPY  2004    COLONOSCOPY  2008    HERNIA REPAIR  03/2001       Social History     Socioeconomic History    Marital status:      Spouse name: Not on file    Number of children: Not on file    Years of education: Not on file    Highest education level: Not on file   Occupational History    Not on file   Social Needs    Financial resource strain: Not on file    Food insecurity:     Worry: Not on file     Inability: Not on file    Transportation needs:     Medical: Not on file     Non-medical: Not on file   Tobacco Use    Smoking status: Former Smoker    Smokeless tobacco: Never Used    Tobacco comment: Stopped 50 years ago.    Substance and Sexual Activity    Alcohol use: No    Drug use: No    Sexual activity: Never   Lifestyle    Physical activity:     Days per week: Not on file     Minutes per session: Not on file    Stress: Not on file   Relationships    Social connections:     Talks on phone: Not on file     Gets together: Not on file     Attends Amish service: Not on file     Active member of club or organization: Not on file     Attends meetings of clubs or organizations: Not on file     Relationship status: Not on file    Intimate partner violence:     Fear of current or ex

## 2019-09-09 ENCOUNTER — TELEPHONE (OUTPATIENT)
Dept: INTERNAL MEDICINE CLINIC | Age: 84
End: 2019-09-09

## 2019-09-09 DIAGNOSIS — F51.02 ADJUSTMENT INSOMNIA: Primary | ICD-10-CM

## 2019-09-09 RX ORDER — AMITRIPTYLINE HYDROCHLORIDE 25 MG/1
25 TABLET, FILM COATED ORAL NIGHTLY
Qty: 90 TABLET | Refills: 1 | Status: SHIPPED | OUTPATIENT
Start: 2019-09-09

## 2019-09-09 NOTE — TELEPHONE ENCOUNTER
Leopoldo Dubois, patient's daughter, states since changing patient's patient's medication to Seroquel, patient has not slept all weekend, has been more active and talkative and appetite has decreased. Please advise. Leopoldo Dubois can be reached at phone number provided.

## 2019-09-09 NOTE — TELEPHONE ENCOUNTER
Called daughter, Kasey Betts, and Lan Shah, to discuss medication changes  Friday slept well with first dose of seroquel  Saturday night did not sleep well - stayed up all night  Sunday  Appetite seems to have decreased as well.      Stop the quetiapine  Restart amitriptyline  Continue melatonin

## 2019-10-31 ENCOUNTER — OFFICE VISIT (OUTPATIENT)
Dept: INTERNAL MEDICINE CLINIC | Age: 84
End: 2019-10-31
Payer: COMMERCIAL

## 2019-10-31 VITALS
DIASTOLIC BLOOD PRESSURE: 102 MMHG | RESPIRATION RATE: 12 BRPM | HEART RATE: 72 BPM | TEMPERATURE: 97.9 F | SYSTOLIC BLOOD PRESSURE: 140 MMHG

## 2019-10-31 DIAGNOSIS — N39.0 COMPLICATED UTI (URINARY TRACT INFECTION): ICD-10-CM

## 2019-10-31 DIAGNOSIS — R30.0 DYSURIA: Primary | ICD-10-CM

## 2019-10-31 DIAGNOSIS — R73.03 PREDIABETES: ICD-10-CM

## 2019-10-31 DIAGNOSIS — Z23 NEED FOR INFLUENZA VACCINATION: ICD-10-CM

## 2019-10-31 DIAGNOSIS — R11.0 NAUSEA: ICD-10-CM

## 2019-10-31 LAB
BILIRUBIN, POC: NORMAL
BLOOD URINE, POC: NEGATIVE
CLARITY, POC: NORMAL
COLOR, POC: YELLOW
GLUCOSE URINE, POC: NEGATIVE
KETONES, POC: NORMAL
LEUKOCYTE EST, POC: POSITIVE
NITRITE, POC: NORMAL
PH, POC: 5
PROTEIN, POC: NORMAL
SPECIFIC GRAVITY, POC: 1.02
UROBILINOGEN, POC: 0.2

## 2019-10-31 PROCEDURE — 99213 OFFICE O/P EST LOW 20 MIN: CPT | Performed by: INTERNAL MEDICINE

## 2019-10-31 PROCEDURE — 90653 IIV ADJUVANT VACCINE IM: CPT | Performed by: INTERNAL MEDICINE

## 2019-10-31 PROCEDURE — 90471 IMMUNIZATION ADMIN: CPT | Performed by: INTERNAL MEDICINE

## 2019-10-31 PROCEDURE — 81002 URINALYSIS NONAUTO W/O SCOPE: CPT | Performed by: INTERNAL MEDICINE

## 2019-10-31 RX ORDER — LEVOFLOXACIN 750 MG/1
750 TABLET ORAL EVERY OTHER DAY
Qty: 3 TABLET | Refills: 0 | Status: SHIPPED | OUTPATIENT
Start: 2019-10-31 | End: 2019-11-05

## 2019-10-31 RX ORDER — NICOTINE POLACRILEX 4 MG/1
20 GUM, CHEWING ORAL DAILY
COMMUNITY
End: 2020-03-05 | Stop reason: ALTCHOICE

## 2019-11-01 ASSESSMENT — ENCOUNTER SYMPTOMS
NAUSEA: 1
ABDOMINAL DISTENTION: 0
VOMITING: 1
BACK PAIN: 1
ABDOMINAL PAIN: 0

## 2019-11-02 LAB — URINE CULTURE, ROUTINE: NORMAL

## 2019-11-14 ENCOUNTER — TELEPHONE (OUTPATIENT)
Dept: INTERNAL MEDICINE CLINIC | Age: 84
End: 2019-11-14

## 2019-11-19 ENCOUNTER — OFFICE VISIT (OUTPATIENT)
Dept: INTERNAL MEDICINE CLINIC | Age: 84
End: 2019-11-19
Payer: COMMERCIAL

## 2019-11-19 VITALS — HEART RATE: 54 BPM | RESPIRATION RATE: 14 BRPM | DIASTOLIC BLOOD PRESSURE: 88 MMHG | SYSTOLIC BLOOD PRESSURE: 160 MMHG

## 2019-11-19 DIAGNOSIS — Z78.0 POSTMENOPAUSAL: Primary | ICD-10-CM

## 2019-11-19 DIAGNOSIS — I71.20 THORACIC AORTIC ANEURYSM WITHOUT RUPTURE: ICD-10-CM

## 2019-11-19 DIAGNOSIS — N28.1 RENAL CYST, LEFT: ICD-10-CM

## 2019-11-19 DIAGNOSIS — I25.10 CORONARY ARTERY DISEASE INVOLVING NATIVE CORONARY ARTERY OF NATIVE HEART WITHOUT ANGINA PECTORIS: ICD-10-CM

## 2019-11-19 DIAGNOSIS — M81.0 AGE-RELATED OSTEOPOROSIS WITHOUT CURRENT PATHOLOGICAL FRACTURE: ICD-10-CM

## 2019-11-19 PROBLEM — F01.518 VASCULAR DEMENTIA WITH BEHAVIOR DISTURBANCE: Status: ACTIVE | Noted: 2018-01-14

## 2019-11-19 PROBLEM — I77.819 AORTIC ECTASIA (HCC): Status: ACTIVE | Noted: 2019-11-19

## 2019-11-19 PROCEDURE — 99215 OFFICE O/P EST HI 40 MIN: CPT | Performed by: INTERNAL MEDICINE

## 2019-11-19 PROCEDURE — 1111F DSCHRG MED/CURRENT MED MERGE: CPT | Performed by: INTERNAL MEDICINE

## 2019-11-19 RX ORDER — ATORVASTATIN CALCIUM 40 MG/1
40 TABLET, FILM COATED ORAL DAILY
Qty: 30 TABLET | Refills: 5 | Status: SHIPPED | OUTPATIENT
Start: 2019-11-19 | End: 2020-03-05

## 2019-11-19 RX ORDER — ATORVASTATIN CALCIUM 40 MG/1
40 TABLET, FILM COATED ORAL DAILY
COMMUNITY
End: 2019-11-19 | Stop reason: SDUPTHER

## 2019-12-11 ENCOUNTER — OFFICE VISIT (OUTPATIENT)
Dept: INTERNAL MEDICINE CLINIC | Age: 84
End: 2019-12-11
Payer: COMMERCIAL

## 2019-12-11 VITALS
TEMPERATURE: 98.5 F | BODY MASS INDEX: 18.55 KG/M2 | WEIGHT: 95 LBS | SYSTOLIC BLOOD PRESSURE: 120 MMHG | HEART RATE: 68 BPM | DIASTOLIC BLOOD PRESSURE: 80 MMHG

## 2019-12-11 DIAGNOSIS — E86.0 DEHYDRATION: ICD-10-CM

## 2019-12-11 DIAGNOSIS — F01.518 VASCULAR DEMENTIA WITH BEHAVIOR DISTURBANCE: ICD-10-CM

## 2019-12-11 DIAGNOSIS — R10.84 GENERALIZED ABDOMINAL PAIN: ICD-10-CM

## 2019-12-11 DIAGNOSIS — R63.4 WEIGHT LOSS, UNINTENTIONAL: ICD-10-CM

## 2019-12-11 DIAGNOSIS — R14.0 ABDOMINAL DISTENSION: Primary | ICD-10-CM

## 2019-12-11 DIAGNOSIS — N18.30 STAGE 3 CHRONIC KIDNEY DISEASE (HCC): ICD-10-CM

## 2019-12-11 DIAGNOSIS — R41.82 ALTERED MENTAL STATUS, UNSPECIFIED ALTERED MENTAL STATUS TYPE: ICD-10-CM

## 2019-12-11 LAB
BILIRUBIN, POC: NORMAL
BLOOD URINE, POC: NORMAL
CLARITY, POC: NORMAL
COLOR, POC: YELLOW
GLUCOSE URINE, POC: NORMAL
KETONES, POC: NORMAL
LEUKOCYTE EST, POC: NORMAL
NITRITE, POC: NORMAL
PH, POC: 5
PROTEIN, POC: NORMAL
SPECIFIC GRAVITY, POC: 1.03
UROBILINOGEN, POC: 0.2

## 2019-12-11 PROCEDURE — 99214 OFFICE O/P EST MOD 30 MIN: CPT | Performed by: INTERNAL MEDICINE

## 2019-12-11 PROCEDURE — 81002 URINALYSIS NONAUTO W/O SCOPE: CPT | Performed by: INTERNAL MEDICINE

## 2020-02-06 ENCOUNTER — TELEPHONE (OUTPATIENT)
Dept: INTERNAL MEDICINE CLINIC | Age: 85
End: 2020-02-06

## 2020-02-12 ENCOUNTER — TELEPHONE (OUTPATIENT)
Dept: INTERNAL MEDICINE CLINIC | Age: 85
End: 2020-02-12

## 2020-03-05 ENCOUNTER — OFFICE VISIT (OUTPATIENT)
Dept: INTERNAL MEDICINE CLINIC | Age: 85
End: 2020-03-05
Payer: MEDICARE

## 2020-03-05 VITALS
HEART RATE: 70 BPM | SYSTOLIC BLOOD PRESSURE: 126 MMHG | DIASTOLIC BLOOD PRESSURE: 88 MMHG | RESPIRATION RATE: 12 BRPM | OXYGEN SATURATION: 100 %

## 2020-03-05 PROCEDURE — 99214 OFFICE O/P EST MOD 30 MIN: CPT | Performed by: INTERNAL MEDICINE

## 2020-03-05 NOTE — PROGRESS NOTES
[address]  Primary Care  Internal Medicine  Follow Up  Sirena Barlow MD      SUBJECTIVE:  Crystal Loera is a 80 y. o.female    Chief Complaint   Patient presents with   [address] currently  No behavioral issues - vascular dementia. Still using amitriptyline at bedtime because helps stem wandering behavior. Her family feels they are handling things well at home  She does not love to eat or drink but she is doing a little bit of each. They would like to avoid any further hospitalizations. Potentially interested in switching hospice agencies. Past Medical History:   Diagnosis Date    [facility]A [facility]cerebral vascular accident) ([facility] [address]     Glaucoma     HTN (hypertension)     Hyperlipidemia     Renal cyst, left     Vascular dementia [facility])        Past Surgical History:   Procedure Laterality Date    CATARACT REMOVAL      COLONOSCOPY  2001    COLONOSCOPY  2004    COLONOSCOPY  2008    HERNIA REPAIR  03/2001       Family History   Problem Relation Age of Onset    No Known Problems Daughter     No Known Problems Daughter     No Known Problems Son        Social History     Socioeconomic History    Marital status:      Spouse name: Not on file    Number of children: Not on file    Years of education: Not on file    Highest education level: Not on file   Occupational History    Not on file   Social Needs    Financial resource strain: Not on file    Food insecurity:     Worry: Not on file     Inability: Not on file    Transportation needs:     Medical: Not on file     Non-medical: Not on file   Tobacco Use    Smoking status: Former Smoker    Smokeless tobacco: Never Used    Tobacco comment: Stopped 50 years ago.    Substance and Sexual Activity    Alcohol use: No    Drug use: No    Sexual activity: Never   Lifestyle    Physical activity:     Days per week: Not on file     Minutes per session: Not on file    Stress: Not on file   Relationships    Social connections: Talks on phone: Not on file     Gets together: Not on file     Attends Tenriism service: Not on file     Active member of club or organization: Not on file     Attends meetings of clubs or organizations: Not on file     Relationship status: Not on file    Intimate partner violence:     Fear of current or ex partner: Not on file     Emotionally abused: Not on file     Physically abused: Not on file     Forced sexual activity: Not on file   Other Topics Concern    Not on file   Social History Narrative    Not on file       Review of Systems   Unable to perform ROS: Dementia       Current Outpatient Medications on File Prior to Visit   Medication Sig Dispense Refill    aspirin 81 MG tablet Take 81 mg by mouth daily      amitriptyline (ELAVIL) 25 MG tablet Take 1 tablet by mouth nightly 90 tablet 1    dorzolamide (TRUSOPT) 2 % ophthalmic solution Place 1 drop into both eyes 2 times daily      metoprolol tartrate (LOPRESSOR) 25 MG tablet Take 1 tablet by mouth 2 times daily (Patient not taking: Reported on 3/5/2020) 60 tablet 5    atorvastatin (LIPITOR) 40 MG tablet Take 1 tablet by mouth daily (Patient not taking: Reported on 3/5/2020) 30 tablet 5    donepezil (ARICEPT) 10 MG tablet TAKE 1 TABLET BY MOUTH ONCE DAILY AT NIGHT (Patient not taking: Reported on 3/5/2020) 30 tablet 5    polyethylene glycol (GLYCOLAX) powder Take 17 g by mouth daily as needed (constipation if no BM after 3 days) (Patient not taking: Reported on 3/5/2020) 510 g 0    bimatoprost (LUMIGAN) 0.01 % SOLN ophthalmic drops Place 1 drop into both eyes nightly      brimonidine (ALPHAGAN) 0.2 % ophthalmic solution Place 1 drop into both eyes 2 times daily       No current facility-administered medications on file prior to visit.           OBJECTIVE:  /88   Pulse 70   Resp 12   SpO2 100%   Wt Readings from Last 3 Encounters:   12/11/19 95 lb (43.1 kg)   09/05/19 108 lb 9.6 oz (49.3 kg)   06/06/19 108 lb 14.5 oz (49.4 kg)     There is prolong suffering and not improve quality of life. They expressed understanding and feel comfortable going forward. I did discuss that if they are unhappy with their current hospice agency they can switch to a different agency. 25 minutes spent with patient- >50 % continuity of care and/or counseling regarding the above. Return if symptoms worsen or fail to improve.

## 2020-03-13 ENCOUNTER — TELEPHONE (OUTPATIENT)
Dept: INTERNAL MEDICINE CLINIC | Age: 85
End: 2020-03-13